# Patient Record
Sex: MALE | Race: BLACK OR AFRICAN AMERICAN | HISPANIC OR LATINO | Employment: OTHER | URBAN - METROPOLITAN AREA
[De-identification: names, ages, dates, MRNs, and addresses within clinical notes are randomized per-mention and may not be internally consistent; named-entity substitution may affect disease eponyms.]

---

## 2022-11-01 ENCOUNTER — OFFICE VISIT (OUTPATIENT)
Dept: UROLOGY | Facility: CLINIC | Age: 72
End: 2022-11-01
Payer: MEDICARE

## 2022-11-01 VITALS — HEART RATE: 84 BPM | DIASTOLIC BLOOD PRESSURE: 74 MMHG | TEMPERATURE: 99 F | SYSTOLIC BLOOD PRESSURE: 139 MMHG

## 2022-11-01 DIAGNOSIS — R97.20 ELEVATED PSA: Primary | ICD-10-CM

## 2022-11-01 LAB — PSA, DIAGNOSTIC: 6.38 NG/ML (ref 0–4)

## 2022-11-01 PROCEDURE — 99204 OFFICE O/P NEW MOD 45 MIN: CPT | Mod: S$GLB,,, | Performed by: UROLOGY

## 2022-11-01 PROCEDURE — 99204 PR OFFICE/OUTPT VISIT, NEW, LEVL IV, 45-59 MIN: ICD-10-PCS | Mod: S$GLB,,, | Performed by: UROLOGY

## 2022-11-01 RX ORDER — ATORVASTATIN CALCIUM 20 MG/1
20 TABLET, FILM COATED ORAL DAILY
COMMUNITY
Start: 2022-10-21

## 2022-11-01 RX ORDER — AMLODIPINE BESYLATE 10 MG/1
10 TABLET ORAL DAILY
COMMUNITY
Start: 2022-09-03

## 2022-11-01 RX ORDER — METFORMIN HYDROCHLORIDE 1000 MG/1
1000 TABLET ORAL 2 TIMES DAILY
COMMUNITY
Start: 2022-09-27

## 2022-11-01 RX ORDER — METOPROLOL SUCCINATE 200 MG/1
TABLET, EXTENDED RELEASE ORAL
COMMUNITY
Start: 2022-10-31

## 2022-11-01 RX ORDER — INSULIN GLARGINE 100 [IU]/ML
INJECTION, SOLUTION SUBCUTANEOUS
COMMUNITY
Start: 2022-05-17

## 2022-11-01 RX ORDER — ALLOPURINOL 300 MG/1
300 TABLET ORAL DAILY
COMMUNITY
Start: 2022-10-09

## 2022-11-01 RX ORDER — DULAGLUTIDE 1.5 MG/.5ML
INJECTION, SOLUTION SUBCUTANEOUS
COMMUNITY
Start: 2022-10-19 | End: 2023-11-30

## 2022-11-01 NOTE — PROGRESS NOTES
Subjective:       Patient ID: Dewey Wade is a 72 y.o. male.    Chief Complaint: Establish Care      HPI:  72-year-old male with a history of kidney stones and elevated PSA former 1E patient has not been seen since mid 2019, last beyond last PSA on record was around 5.34.9 area patient has not had any real issues does have a history kidney stones in the past    Past Medical History:   Past Medical History:   Diagnosis Date    Diabetes mellitus     Hypertension     Kidney stone        Past Surgical Historical:   Past Surgical History:   Procedure Laterality Date    HERNIA REPAIR          Medications:   Medication List with Changes/Refills   Current Medications    ALLOPURINOL (ZYLOPRIM) 300 MG TABLET    Take 300 mg by mouth once daily.    AMLODIPINE (NORVASC) 10 MG TABLET    Take 10 mg by mouth once daily.    ATORVASTATIN (LIPITOR) 20 MG TABLET    Take 20 mg by mouth once daily.    BASAGLAR KWIKPEN U-100 INSULIN GLARGINE 100 UNITS/ML SUBQ PEN    INJECT 40 UNITS BY SUBCUTANEOUS ROUTE AT BEDTIME    METFORMIN (GLUCOPHAGE) 1000 MG TABLET    Take 1,000 mg by mouth 2 (two) times daily.    METOPROLOL SUCCINATE (TOPROL-XL) 200 MG 24 HR TABLET        TRULICITY 1.5 MG/0.5 ML PEN INJECTOR    INJECT 0.5 MILLILITER (1.5 MG) BY SUBCUTANEOUS ROUTE EVERY 7 DAYS        Past Social History:   Social History     Socioeconomic History    Marital status: Unknown   Tobacco Use    Smoking status: Never    Smokeless tobacco: Never   Substance and Sexual Activity    Alcohol use: Never    Drug use: Never       Allergies: Review of patient's allergies indicates:  No Known Allergies     Family History: History reviewed. No pertinent family history.     Review of Systems:  Review of Systems   Constitutional:  Negative for activity change and appetite change.   HENT:  Negative for congestion and dental problem.    Eyes:  Negative for visual disturbance.   Respiratory:  Negative for chest tightness and shortness of breath.    Cardiovascular:   Negative for chest pain.   Gastrointestinal:  Negative for abdominal distention and abdominal pain.   Endocrine: Negative for polyuria.   Genitourinary:  Negative for difficulty urinating, dysuria, flank pain, frequency, hematuria, penile discharge, penile pain, penile swelling, scrotal swelling, testicular pain and urgency.   Musculoskeletal:  Negative for back pain and neck pain.   Skin:  Negative for color change.   Allergic/Immunologic: Positive for immunocompromised state.   Neurological:  Negative for dizziness.   Hematological:  Negative for adenopathy.   Psychiatric/Behavioral:  Negative for agitation, behavioral problems and confusion.      Physical Exam:  Physical Exam  Constitutional:       General: He is not in acute distress.     Appearance: He is well-developed.   HENT:      Head: Normocephalic and atraumatic.      Nose: Nose normal.   Eyes:      General: No scleral icterus.     Conjunctiva/sclera: Conjunctivae normal.      Pupils: Pupils are equal, round, and reactive to light.   Neck:      Thyroid: No thyromegaly.      Trachea: No tracheal deviation.   Cardiovascular:      Rate and Rhythm: Normal rate and regular rhythm.      Heart sounds: Normal heart sounds.   Pulmonary:      Effort: Pulmonary effort is normal. No respiratory distress.      Breath sounds: Normal breath sounds. No wheezing or rales.   Abdominal:      General: Bowel sounds are normal. There is no distension.      Palpations: Abdomen is soft.      Tenderness: There is no abdominal tenderness. There is no guarding or rebound.   Genitourinary:     Penis: Normal. No tenderness.       Prostate: Normal.   Musculoskeletal:         General: No deformity. Normal range of motion.      Cervical back: Neck supple.   Lymphadenopathy:      Cervical: No cervical adenopathy.   Skin:     General: Skin is warm and dry.      Findings: No erythema or rash.   Neurological:      Mental Status: He is alert and oriented to person, place, and time.       Cranial Nerves: No cranial nerve deficit.   Psychiatric:         Behavior: Behavior normal.       Assessment/Plan:       Problem List Items Addressed This Visit    None  Visit Diagnoses       Elevated PSA    -  Primary    Relevant Orders    Prostate Specific Antigen, Diagnostic               Nephrolithiasis:  Currently asymptomatic     Elevated PSA we will draw the patient's PSA determine need for biopsy at that time.

## 2022-11-02 ENCOUNTER — TELEPHONE (OUTPATIENT)
Dept: UROLOGY | Facility: CLINIC | Age: 72
End: 2022-11-02
Payer: MEDICARE

## 2022-11-02 DIAGNOSIS — R97.20 ELEVATED PSA: Primary | ICD-10-CM

## 2022-11-02 NOTE — TELEPHONE ENCOUNTER
Patient notified of PSA results. The office will be contacting him to schedule a biopsy. Patient verbalized understanding and stated that he did not have any questions at this time.

## 2022-11-02 NOTE — TELEPHONE ENCOUNTER
----- Message from Sol Knowles sent at 11/2/2022  2:20 PM CDT -----  Contact: self  Pt returned office call. Pt can be reached at [phone]

## 2022-12-16 DIAGNOSIS — R97.20 ELEVATED PSA: Primary | ICD-10-CM

## 2022-12-19 ENCOUNTER — TELEPHONE (OUTPATIENT)
Dept: UROLOGY | Facility: CLINIC | Age: 72
End: 2022-12-19

## 2022-12-19 RX ORDER — CIPROFLOXACIN 500 MG/1
500 TABLET ORAL 2 TIMES DAILY
Qty: 8 TABLET | Refills: 0 | Status: SHIPPED | OUTPATIENT
Start: 2023-01-02 | End: 2022-12-27

## 2022-12-19 RX ORDER — DIAZEPAM 10 MG/1
10 TABLET ORAL ONCE
Qty: 1 TABLET | Refills: 0 | Status: SHIPPED | OUTPATIENT
Start: 2022-12-19 | End: 2022-12-27

## 2022-12-19 NOTE — TELEPHONE ENCOUNTER
Pt rescheduled nurse visit due to covid.     ----- Message from Lashell Waldron sent at 12/19/2022 10:04 AM CST -----  Contact: self  Patient called to reschedule his nurse visit for today at 3 pm due to having Covid please call at [phone] ([facility])/     [phone]

## 2022-12-27 DIAGNOSIS — R97.20 ELEVATED PSA: Primary | ICD-10-CM

## 2022-12-27 RX ORDER — DIAZEPAM 10 MG/1
10 TABLET ORAL ONCE
Qty: 1 TABLET | Refills: 0 | Status: SHIPPED | OUTPATIENT
Start: 2022-12-27 | End: 2022-12-27

## 2022-12-27 RX ORDER — CIPROFLOXACIN 500 MG/1
500 TABLET ORAL 2 TIMES DAILY
Qty: 8 TABLET | Refills: 0 | Status: SHIPPED | OUTPATIENT
Start: 2022-12-27

## 2023-01-03 ENCOUNTER — PROCEDURE VISIT (OUTPATIENT)
Dept: UROLOGY | Facility: CLINIC | Age: 73
End: 2023-01-03
Payer: MEDICARE

## 2023-01-03 VITALS
BODY MASS INDEX: 32.99 KG/M2 | WEIGHT: 270.88 LBS | HEIGHT: 76 IN | HEART RATE: 75 BPM | SYSTOLIC BLOOD PRESSURE: 93 MMHG | RESPIRATION RATE: 16 BRPM | DIASTOLIC BLOOD PRESSURE: 52 MMHG | OXYGEN SATURATION: 96 %

## 2023-01-03 DIAGNOSIS — R97.20 ELEVATED PSA: Primary | ICD-10-CM

## 2023-01-03 PROCEDURE — 76872 US TRANSRECTAL: CPT | Mod: 26,S$GLB,, | Performed by: UROLOGY

## 2023-01-03 PROCEDURE — 55700: CPT | Mod: S$GLB,,, | Performed by: UROLOGY

## 2023-01-03 PROCEDURE — 76872: ICD-10-PCS | Mod: 26,S$GLB,, | Performed by: UROLOGY

## 2023-01-03 PROCEDURE — 55700: ICD-10-PCS | Mod: S$GLB,,, | Performed by: UROLOGY

## 2023-01-03 NOTE — PROCEDURES
"Transrectal ultrasound-guided prostate biopsy    Date/Time: 1/3/2023 10:00 AM  Performed by: Sandro Vasquez MD  Authorized by: Yesenia Temple NP     Consent Done?:  Yes (Written)  Time out: Immediately prior to procedure a "time out" was called to verify the correct patient, procedure, equipment, support staff and site/side marked as required.    Indications: Elevated PSA    Position:  Left lateral  Anesthesia:  Pudendal nerve block  Patient sedated: No    Prostate Size:  122g  Left Base Biopsies: 2  Left Mid Biopsies: 2  Left [place] Biopsies: 2  Right Base Biopsies: 2  Right Mid Biopsies: 2  Right [place] Biopsies: 2  Total Biopsies:  12    Patient tolerance:  Patient tolerated the procedure well with no immediate complications     Patient was brought to the procedure room placed on the table in left lateral decubitus position lidocaine jelly was instilled into the rectum the ultrasound probe was advanced to level of prostate and a total of 10 cc of lidocaine was injected into the bilateral ana prostatic fossa.  A standard 12 core prostate biopsy was obtained patient tolerated the procedure well there were no complications  "

## 2023-01-03 NOTE — PATIENT INSTRUCTIONS
Patient Education       Prostate Biopsy Discharge Instructions   About this topic   The prostate is a part of your body that helps make semen. The prostate is located at the base of the penis and in front of the rectum.  Prostate biopsy is done:  To help your doctor know if the lump or tumor in your prostate is cancer or not.  If your blood test, called PSA or prostate specific antigen, is high. High PSA in the blood means disease in the prostate.  During a prostate biopsy, the doctor uses a needle to collect pieces of tissue from the prostate. The doctor sends the tissue to the lab. The lab then checks the tissue for infection or cancer.     What care is needed at home?   Ask your doctor what you need to do when you go home. Make sure you ask questions if you do not understand what the doctor says. This way you will know what you need to do.  Rest after the procedure to prevent bleeding from the biopsy site. Avoid activities like heavy lifting and hard exercise.  You may see some blood in your urine or stools for the next few days. You may also have blood in your semen for a few weeks.  Drink up to 8 glasses of water a day to help flush out blood.  Use an ice pack to help with the pain and to help stop the bleeding for the first 2 days after the procedure. Place an ice pack or a bag of frozen peas wrapped in a towel over the painful part. Never put ice right on the skin. Do not leave the ice on more than 10 to 15 minutes at a time. Use ice each hour as needed.  Keep your rectal opening and penis clean to prevent infection.  Keep your wound dry for the next 24 hours. Ask your doctor about when you may take a bath or shower.  Change the dressing if it gets soaked.  What follow-up care is needed?   Your doctor may ask you to make visits to the office to check on your progress. Be sure to keep your visits.  It may take up to 2 weeks for your doctor to get the results. You may be asked to return to the doctor's office  for the result of the biopsy in 2 to 3 weeks. The results will help your doctor understand what kind of problem you have with your prostate. Together you can make a plan for more care.  Your doctor will talk with you if any other treatment is needed.  What drugs may be needed?   The doctor may order drugs to:  Help with pain  Prevent infection  Will physical activity be limited?   Avoid doing activities that may put pressure on your rectal area for the next 7 days. You may be more comfortable if you do not ride a bike, horse, or motorcycle.  Limit your sexual activity for a few days after the procedure. Ask your doctor when you can have sex.  Do not strain when going to the bathroom. Don't hold your urine. Holding back from urinating can irritate your bladder and lead to a urinary tract infection.  Avoid constipation by eating foods high in fiber and staying hydrated. Straining to pass stool can worsen your symptoms as you heal.  What problems could happen?   Infection at the biopsy site  Infection elsewhere in your body  Bleeding from your rectum, or blood in your urine or semen  Tumor spread  Very bad pain  Bladder or rectum perforation  Urinary tract infection  Trouble passing urine  Erectile dysfunction  Reduced sexual activity  When do I need to call the doctor?   Signs of infection like fever of 100.4°F (38°C) or higher, chills, burning or pain when you pass urine.  No urine or more problems passing urine  Dizziness, confusion, or weakness  Yellowish, greenish, or bloody discharge from the penis  Lots of rectal bleeding or large amounts of blood in the urine  Pain does not go away even after taking your drugs  Teach Back: Helping You Understand   The Teach Back Method helps you understand the information we are giving you. After you talk with the staff, tell them in your own words what you learned. This helps to make sure the staff has described each thing clearly. It also helps to explain things that may have  been confusing. Before going home, make sure you can do these:  I can tell you about my procedure.  I can tell you what may help ease my pain.  I can tell you what I will do if I have a fever, chills, problems passing urine, or drainage from my penis.  NO LIFTING GREATER THAN 10 LBS  NO STRENUOUS ACTIVITY FOR 3 DAYS  NO SEXUAL ACTIVITY FOR 7 DAYS    Where can I learn more?   American Cancer Society  https://www.cancer.org/cancer/prostate-cancer/evchbfxcs-mtrbvuhls-ikojkkq/how-diagnosed.html   [employer] of [place]  https://www.radiologyinfo.org/en/info.cfm?pg=prostate-biopsy   Last Reviewed Date   2021-05-18  Consumer Information Use and Disclaimer   This information is not specific medical advice and does not replace information you receive from your health care provider. This is only a brief summary of general information. It does NOT include all information about conditions, illnesses, injuries, tests, procedures, treatments, therapies, discharge instructions or life-style choices that may apply to you. You must talk with your health care provider for complete information about your health and treatment options. This information should not be used to decide whether or not to accept your health care providers advice, instructions or recommendations. Only your health care provider has the knowledge and training to provide advice that is right for you.  Copyright   Copyright © 2021 [employer] and its affiliates and/or licensors. All rights reserved.

## 2023-01-06 ENCOUNTER — TELEPHONE (OUTPATIENT)
Dept: UROLOGY | Facility: CLINIC | Age: 73
End: 2023-01-06
Payer: MEDICARE

## 2023-01-11 ENCOUNTER — TELEPHONE (OUTPATIENT)
Dept: UROLOGY | Facility: CLINIC | Age: 73
End: 2023-01-11
Payer: MEDICARE

## 2023-01-11 NOTE — TELEPHONE ENCOUNTER
----- Message from Edna Cantu sent at 1/11/2023  1:41 PM CST -----  Contact: pt  Pt calling wanting to know if he can donate blood and he can be reached at [phone].    Thanks,

## 2023-01-11 NOTE — TELEPHONE ENCOUNTER
Patient notified there are no urologic restrictions in regards to blood donation, advised to check with facility he is interested in donating to see if he meets their criteria. Verbalized understanding.

## 2023-05-01 ENCOUNTER — CLINICAL SUPPORT (OUTPATIENT)
Dept: UROLOGY | Facility: CLINIC | Age: 73
End: 2023-05-01
Payer: MEDICARE

## 2023-05-01 DIAGNOSIS — R97.20 ELEVATED PSA: Primary | ICD-10-CM

## 2023-05-01 LAB — PSA, DIAGNOSTIC: 8.38 NG/ML (ref 0.1–4)

## 2023-05-01 NOTE — PROGRESS NOTES
Here for PSA draw.  Right A/C x straight stick x 1.  Gauze and coban applied.  Pt tolerated well.  Aware of next appointment.

## 2023-05-09 ENCOUNTER — OFFICE VISIT (OUTPATIENT)
Dept: UROLOGY | Facility: CLINIC | Age: 73
End: 2023-05-09
Payer: MEDICARE

## 2023-05-09 DIAGNOSIS — R97.20 ELEVATED PSA: Primary | ICD-10-CM

## 2023-05-09 PROCEDURE — 99214 OFFICE O/P EST MOD 30 MIN: CPT | Mod: S$GLB,,, | Performed by: UROLOGY

## 2023-05-09 PROCEDURE — 99214 PR OFFICE/OUTPT VISIT, EST, LEVL IV, 30-39 MIN: ICD-10-PCS | Mod: S$GLB,,, | Performed by: UROLOGY

## 2023-05-09 NOTE — PROGRESS NOTES
Subjective:       Patient ID: Dewey Wade is a 72 y.o. male.    Chief Complaint: 6 month psa f/u      HPI:  9-year-old male history of elevated PSA underwent prostate biopsy in January of 2023 he had 1 core of ASAP PSA prior to that was 6.38 in November now it is 8.38.    Past Medical History:   Past Medical History:   Diagnosis Date    Diabetes mellitus     Hypertension     Kidney stone        Past Surgical Historical:   Past Surgical History:   Procedure Laterality Date    HERNIA REPAIR          Medications:   Medication List with Changes/Refills   Current Medications    ALLOPURINOL (ZYLOPRIM) 300 MG TABLET    Take 300 mg by mouth once daily.    AMLODIPINE (NORVASC) 10 MG TABLET    Take 10 mg by mouth once daily.    ATORVASTATIN (LIPITOR) 20 MG TABLET    Take 20 mg by mouth once daily.    BASAGLAR KWIKPEN U-100 INSULIN GLARGINE 100 UNITS/ML SUBQ PEN    INJECT 40 UNITS BY SUBCUTANEOUS ROUTE AT BEDTIME    CIPROFLOXACIN HCL (CIPRO) 500 MG TABLET    Take 1 tablet (500 mg total) by mouth 2 (two) times daily.    DIAZEPAM (VALIUM) 10 MG TAB    Take 1 tablet (10 mg total) by mouth once. for 1 dose    METFORMIN (GLUCOPHAGE) 1000 MG TABLET    Take 1,000 mg by mouth 2 (two) times daily.    METOPROLOL SUCCINATE (TOPROL-XL) 200 MG 24 HR TABLET        TRULICITY 1.5 MG/0.5 ML PEN INJECTOR    INJECT 0.5 MILLILITER (1.5 MG) BY SUBCUTANEOUS ROUTE EVERY 7 DAYS        Past Social History:   Social History     Socioeconomic History    Marital status: Unknown   Tobacco Use    Smoking status: Never    Smokeless tobacco: Never   Substance and Sexual Activity    Alcohol use: Never    Drug use: Never       Allergies: Review of patient's allergies indicates:  No Known Allergies     Family History: History reviewed. No pertinent family history.     Review of Systems:  Review of Systems   Constitutional:  Negative for activity change and appetite change.   HENT:  Negative for congestion and dental problem.    Eyes:  Negative for visual  disturbance.   Respiratory:  Negative for chest tightness and shortness of breath.    Cardiovascular:  Negative for chest pain.   Gastrointestinal:  Negative for abdominal distention and abdominal pain.   Endocrine: Negative for polyuria.   Genitourinary:  Negative for difficulty urinating, dysuria, flank pain, frequency, hematuria, penile discharge, penile pain, penile swelling, scrotal swelling, testicular pain and urgency.   Musculoskeletal:  Negative for back pain and neck pain.   Skin:  Negative for color change.   Allergic/Immunologic: Positive for immunocompromised state.   Neurological:  Negative for dizziness.   Hematological:  Negative for adenopathy.   Psychiatric/Behavioral:  Negative for agitation, behavioral problems and confusion.      Physical Exam:  Physical Exam  Constitutional:       General: He is not in acute distress.     Appearance: He is well-developed.   HENT:      Head: Normocephalic and atraumatic.      Nose: Nose normal.   Eyes:      General: No scleral icterus.     Conjunctiva/sclera: Conjunctivae normal.      Pupils: Pupils are equal, round, and reactive to light.   Neck:      Thyroid: No thyromegaly.      Trachea: No tracheal deviation.   Cardiovascular:      Rate and Rhythm: Normal rate and regular rhythm.      Heart sounds: Normal heart sounds.   Pulmonary:      Effort: Pulmonary effort is normal. No respiratory distress.      Breath sounds: Normal breath sounds. No wheezing or rales.   Abdominal:      General: Bowel sounds are normal. There is no distension.      Palpations: Abdomen is soft.      Tenderness: There is no abdominal tenderness. There is no guarding or rebound.   Genitourinary:     Penis: Normal. No tenderness.       Prostate: Normal.   Musculoskeletal:         General: No deformity. Normal range of motion.      Cervical back: Neck supple.   Lymphadenopathy:      Cervical: No cervical adenopathy.   Skin:     General: Skin is warm and dry.      Findings: No erythema or  rash.   Neurological:      Mental Status: He is alert and oriented to person, place, and time.      Cranial Nerves: No cranial nerve deficit.   Psychiatric:         Behavior: Behavior normal.       Assessment/Plan:       Problem List Items Addressed This Visit    None  Visit Diagnoses       Elevated PSA    -  Primary    Relevant Orders    MRI Prostate W W/O Contrast               Actively rising PSA despite negative biopsy 4 months ago.  We discussed MRI of the prostate we will order this return to clinic after MRI is complete

## 2023-05-17 ENCOUNTER — TELEPHONE (OUTPATIENT)
Dept: UROLOGY | Facility: CLINIC | Age: 73
End: 2023-05-17
Payer: MEDICARE

## 2023-05-17 NOTE — TELEPHONE ENCOUNTER
Spoke with pt and informed him he should call [facility] to see if he should be NPO. Pt verbalized understanding.     ----- Message from Christi Leo sent at 5/17/2023  1:13 PM CDT -----  Regarding: Xray's  Contact: patient  Per phone call with  patient,he stated that he has an xray MRI prostate with or without contrast to do on tomorrow and he wanted to know if he should eat anything or not.  Please return call at [phone] (home).    Thanks,  SJ

## 2023-05-24 ENCOUNTER — TELEPHONE (OUTPATIENT)
Dept: UROLOGY | Facility: CLINIC | Age: 73
End: 2023-05-24
Payer: MEDICARE

## 2023-05-24 NOTE — TELEPHONE ENCOUNTER
----- Message from Yesenia Temple NP sent at 5/23/2023  2:09 PM CDT -----  Please notify patient to follow up in 6 months for psa draw/follow up visit. Pi rads 2 noted on MRI which is not a concerning finding. Result reviewed with Dr. Vasquez

## 2023-05-25 ENCOUNTER — TELEPHONE (OUTPATIENT)
Dept: UROLOGY | Facility: CLINIC | Age: 73
End: 2023-05-25
Payer: MEDICARE

## 2023-05-25 NOTE — TELEPHONE ENCOUNTER
Contacted pt, advised of results, 6 month psa appts scheduled. Pt verbalized understanding. BJP  ----- Message from Carmita Perez sent at 5/25/2023  8:42 AM CDT -----  Contact: self  Pt is calling in regards to MRI results. Please call pt at [phone] or [phone]

## 2023-11-27 ENCOUNTER — CLINICAL SUPPORT (OUTPATIENT)
Dept: UROLOGY | Facility: CLINIC | Age: 73
End: 2023-11-27
Payer: MEDICARE

## 2023-11-27 DIAGNOSIS — R97.20 ELEVATED PSA: Primary | ICD-10-CM

## 2023-11-27 LAB — PSA, DIAGNOSTIC: 8.26 NG/ML (ref 0.1–4)

## 2023-11-27 NOTE — PROGRESS NOTES
Here for PSA draw via right A/C with straight stick x 1.  Gauze and coban applied.  Tolerated  well.

## 2023-11-30 ENCOUNTER — OFFICE VISIT (OUTPATIENT)
Dept: UROLOGY | Facility: CLINIC | Age: 73
End: 2023-11-30
Payer: MEDICARE

## 2023-11-30 VITALS — BODY MASS INDEX: 34.95 KG/M2 | HEIGHT: 76 IN | WEIGHT: 287 LBS

## 2023-11-30 DIAGNOSIS — R97.20 ELEVATED PSA: Primary | ICD-10-CM

## 2023-11-30 PROCEDURE — 99214 PR OFFICE/OUTPT VISIT, EST, LEVL IV, 30-39 MIN: ICD-10-PCS | Mod: S$GLB,,, | Performed by: UROLOGY

## 2023-11-30 PROCEDURE — 99214 OFFICE O/P EST MOD 30 MIN: CPT | Mod: S$GLB,,, | Performed by: UROLOGY

## 2023-11-30 RX ORDER — HYDRALAZINE HYDROCHLORIDE 25 MG/1
25 TABLET, FILM COATED ORAL 3 TIMES DAILY
COMMUNITY
Start: 2023-09-25

## 2023-11-30 NOTE — PROGRESS NOTES
Subjective:       Patient ID: Dewey Wade is a 73 y.o. male.    Chief Complaint: No chief complaint on file.      HPI: 73-year-old male patient presenting to the clinic to follow up for elevated PSA.  Patient's most recent PSA on 11/27/2023 was 8.26.  He had a benign biopsy with 1 core ASAP on 01/03/2023.  He also had a prostate MRI on 05/19/2023 PI-RADS 2..  He denies any family history of prostate cancer.    11/27/2023   8.26  5/19/2023    Prostate MRI - PI-RADS 2  05/01/2023  8.38  01/03/2023  Benign Prostate biopsy/ 1 core ASAP  11/01/2022  6.380         Past Medical History:   Past Medical History:   Diagnosis Date    Diabetes mellitus     Hypertension     Kidney stone        Past Surgical Historical:   Past Surgical History:   Procedure Laterality Date    COLONOSCOPY      HERNIA REPAIR          Medications:   Medication List with Changes/Refills   Current Medications    ALLOPURINOL (ZYLOPRIM) 300 MG TABLET    Take 300 mg by mouth once daily.    AMLODIPINE (NORVASC) 10 MG TABLET    Take 10 mg by mouth once daily.    ATORVASTATIN (LIPITOR) 20 MG TABLET    Take 20 mg by mouth once daily.    BASAGLAR KWIKPEN U-100 INSULIN GLARGINE 100 UNITS/ML SUBQ PEN    INJECT 40 UNITS BY SUBCUTANEOUS ROUTE AT BEDTIME    CIPROFLOXACIN HCL (CIPRO) 500 MG TABLET    Take 1 tablet (500 mg total) by mouth 2 (two) times daily.    DIAZEPAM (VALIUM) 10 MG TAB    Take 1 tablet (10 mg total) by mouth once. for 1 dose    HYDRALAZINE (APRESOLINE) 25 MG TABLET    Take 25 mg by mouth 3 (three) times daily.    METFORMIN (GLUCOPHAGE) 1000 MG TABLET    Take 1,000 mg by mouth 2 (two) times daily.    METOPROLOL SUCCINATE (TOPROL-XL) 200 MG 24 HR TABLET       Discontinued Medications    TRULICITY 1.5 MG/0.5 ML PEN INJECTOR    INJECT 0.5 MILLILITER (1.5 MG) BY SUBCUTANEOUS ROUTE EVERY 7 DAYS        Past Social History:   Social History     Socioeconomic History    Marital status: Unknown   Tobacco Use    Smoking status: Never    Smokeless tobacco:  Never   Substance and Sexual Activity    Alcohol use: Never    Drug use: Never       Allergies: Review of patient's allergies indicates:  No Known Allergies     Family History: No family history on file.     Review of Systems:  Review of Systems   Constitutional: Negative.    HENT: Negative.     Eyes: Negative.    Respiratory: Negative.     Cardiovascular: Negative.    Gastrointestinal: Negative.    Endocrine: Negative.    Genitourinary: Negative.    Musculoskeletal: Negative.    Skin: Negative.    Allergic/Immunologic: Negative.    Neurological: Negative.    Hematological: Negative.    Psychiatric/Behavioral: Negative.         Physical Exam:  Physical Exam  Constitutional:       Appearance: He is normal weight.   HENT:      Head: Normocephalic.      Nose: Nose normal.      Mouth/Throat:      Mouth: Mucous membranes are moist.      Pharynx: Oropharynx is clear.   Eyes:      Extraocular Movements: Extraocular movements intact.      Conjunctiva/sclera: Conjunctivae normal.      Pupils: Pupils are equal, round, and reactive to light.   Cardiovascular:      Rate and Rhythm: Normal rate and regular rhythm.      Pulses: Normal pulses.      Heart sounds: Normal heart sounds.   Pulmonary:      Effort: Pulmonary effort is normal.      Breath sounds: Normal breath sounds.   Abdominal:      General: Abdomen is flat. Bowel sounds are normal.      Palpations: Abdomen is soft.      Hernia: There is no hernia in the right inguinal area or left inguinal area.   Genitourinary:     Penis: Normal. No phimosis, paraphimosis, hypospadias, erythema, tenderness or discharge.       Testes: Normal.         Right: Mass, tenderness or swelling not present. Right testis is descended. Cremasteric reflex is present.          Left: Mass, tenderness or swelling not present. Left testis is descended. Cremasteric reflex is present.       Prostate: Normal.      Rectum: Normal.   Musculoskeletal:         General: Normal range of motion.      Cervical  back: Normal range of motion and neck supple.   Lymphadenopathy:      Lower Body: No right inguinal adenopathy. No left inguinal adenopathy.   Skin:     General: Skin is warm and dry.      Capillary Refill: Capillary refill takes less than 2 seconds.   Neurological:      General: No focal deficit present.      Mental Status: He is alert and oriented to person, place, and time. Mental status is at baseline.   Psychiatric:         Mood and Affect: Mood normal.         Behavior: Behavior normal.         Thought Content: Thought content normal.         Judgment: Judgment normal.         Assessment/Plan:     Elevated PSA:  Patient has had a negative prostate biopsy as well as negative prostate MRI.  He denies any family history of prostate cancer.  His PSA has decreased slightly in comparison to his last PSA in May.  He will return to clinic in 6 months with a PSA    I, Dr. Sandro Vasquez have seen and personally evaluated the patient. I have formulated the plan reviewed all pertinent imaging and clinical data.  I agree with the nurse practitioner's assessment, and I have personally formulated the plan for this patient's care as described by the midlevel.  Problem List Items Addressed This Visit    None

## 2023-12-01 ENCOUNTER — TELEPHONE (OUTPATIENT)
Dept: UROLOGY | Facility: CLINIC | Age: 73
End: 2023-12-01
Payer: MEDICARE

## 2023-12-01 NOTE — TELEPHONE ENCOUNTER
Contacted pt, he wants to know if he can still donate blood. Advised that he can do so. Pt also would like a copy of results, advised that it will be at . [facility]    ----- Message from Hillary Muñiz sent at 12/1/2023 10:45 AM CST -----  Patient is requesting a call back.  Please send call him back at [phone].            Thanks  [email or web address]

## 2024-05-30 ENCOUNTER — CLINICAL SUPPORT (OUTPATIENT)
Dept: UROLOGY | Facility: CLINIC | Age: 74
End: 2024-05-30
Payer: MEDICARE

## 2024-05-30 DIAGNOSIS — R97.20 ELEVATED PSA: Primary | ICD-10-CM

## 2024-05-30 LAB — PSA, DIAGNOSTIC: 8.71 NG/ML (ref 0.1–4)

## 2024-05-30 PROCEDURE — 36415 COLL VENOUS BLD VENIPUNCTURE: CPT | Mod: S$GLB,,, | Performed by: UROLOGY

## 2024-05-30 NOTE — PROGRESS NOTES
Verified name and . Using aseptic technique, straight stick 22g x1 with success. 1x1 gauze and bandage applied to puncture site. Confirmed scheduled follow up appt. Tolerated well.

## 2024-06-04 ENCOUNTER — OFFICE VISIT (OUTPATIENT)
Dept: UROLOGY | Facility: CLINIC | Age: 74
End: 2024-06-04
Payer: MEDICARE

## 2024-06-04 VITALS
HEART RATE: 70 BPM | SYSTOLIC BLOOD PRESSURE: 178 MMHG | RESPIRATION RATE: 20 BRPM | DIASTOLIC BLOOD PRESSURE: 89 MMHG | OXYGEN SATURATION: 98 %

## 2024-06-04 DIAGNOSIS — R97.20 ELEVATED PSA: Primary | ICD-10-CM

## 2024-06-04 PROCEDURE — 99214 OFFICE O/P EST MOD 30 MIN: CPT | Mod: S$GLB,,, | Performed by: UROLOGY

## 2024-06-04 RX ORDER — HYDROCHLOROTHIAZIDE 25 MG/1
25 TABLET ORAL
COMMUNITY
Start: 2024-03-13

## 2024-06-04 NOTE — PROGRESS NOTES
Subjective:       Patient ID: Dewey Wade is a 73 y.o. male.    Chief Complaint: 6 mth f/u, H/O elevated PSA, and scrotal cyst/bump      HPI: 73-year-old male patient presenting to the clinic to follow up for elevated PSA.  Patient's most recent PSA on 05/30/2024 was 8.71.  He had a benign biopsy with 1 core ASAP on 01/03/2023.  He also had a prostate MRI on 05/19/2023 PI-RADS 2..  He denies any family history of prostate cancer.    05/30/2024   8.71  11/27/2023   8.26  5/19/2023    Prostate MRI - PI-RADS 2  05/01/2023  8.38  01/03/2023  Benign Prostate biopsy/ 1 core ASAP  11/01/2022  6.380         Past Medical History:   Past Medical History:   Diagnosis Date    Diabetes mellitus     Elevated PSA     Hypertension     Kidney stone        Past Surgical Historical:   Past Surgical History:   Procedure Laterality Date    COLONOSCOPY      HERNIA REPAIR          Medications:   Medication List with Changes/Refills   Current Medications    ALLOPURINOL (ZYLOPRIM) 300 MG TABLET    Take 300 mg by mouth once daily.    AMLODIPINE (NORVASC) 10 MG TABLET    Take 10 mg by mouth once daily.    ATORVASTATIN (LIPITOR) 20 MG TABLET    Take 20 mg by mouth once daily.    BASAGLAR KWIKPEN U-100 INSULIN GLARGINE 100 UNITS/ML SUBQ PEN    INJECT 40 UNITS BY SUBCUTANEOUS ROUTE AT BEDTIME    CIPROFLOXACIN HCL (CIPRO) 500 MG TABLET    Take 1 tablet (500 mg total) by mouth 2 (two) times daily.    DIAZEPAM (VALIUM) 10 MG TAB    Take 1 tablet (10 mg total) by mouth once. for 1 dose    HYDRALAZINE (APRESOLINE) 25 MG TABLET    Take 25 mg by mouth 3 (three) times daily.    HYDROCHLOROTHIAZIDE (HYDRODIURIL) 25 MG TABLET    Take 25 mg by mouth.    METFORMIN (GLUCOPHAGE) 1000 MG TABLET    Take 1,000 mg by mouth 2 (two) times daily.    METOPROLOL SUCCINATE (TOPROL-XL) 200 MG 24 HR TABLET            Past Social History:   Social History     Socioeconomic History    Marital status: Unknown   Tobacco Use    Smoking status: Never    Smokeless tobacco:  Never   Substance and Sexual Activity    Alcohol use: Never    Drug use: Never       Allergies: Review of patient's allergies indicates:  No Known Allergies     Family History: No family history on file.     Review of Systems:  Review of Systems   Constitutional: Negative.    HENT: Negative.     Eyes: Negative.    Respiratory: Negative.     Cardiovascular: Negative.    Gastrointestinal: Negative.    Endocrine: Negative.    Genitourinary: Negative.    Musculoskeletal: Negative.    Skin: Negative.    Allergic/Immunologic: Negative.    Neurological: Negative.    Hematological: Negative.    Psychiatric/Behavioral: Negative.         Physical Exam:  Physical Exam  Constitutional:       Appearance: He is normal weight.   HENT:      Head: Normocephalic.      Nose: Nose normal.      Mouth/Throat:      Mouth: Mucous membranes are moist.      Pharynx: Oropharynx is clear.   Eyes:      Extraocular Movements: Extraocular movements intact.      Conjunctiva/sclera: Conjunctivae normal.      Pupils: Pupils are equal, round, and reactive to light.   Cardiovascular:      Rate and Rhythm: Normal rate and regular rhythm.      Pulses: Normal pulses.      Heart sounds: Normal heart sounds.   Pulmonary:      Effort: Pulmonary effort is normal.      Breath sounds: Normal breath sounds.   Abdominal:      General: Abdomen is flat. Bowel sounds are normal.      Palpations: Abdomen is soft.      Hernia: There is no hernia in the right inguinal area or left inguinal area.   Genitourinary:     Penis: Normal. No phimosis, paraphimosis, hypospadias, erythema, tenderness or discharge.       Testes: Normal.         Right: Mass, tenderness or swelling not present. Right testis is descended. Cremasteric reflex is present.          Left: Mass, tenderness or swelling not present. Left testis is descended. Cremasteric reflex is present.       Prostate: Normal.      Rectum: Normal.   Musculoskeletal:         General: Normal range of motion.      Cervical  back: Normal range of motion and neck supple.   Lymphadenopathy:      Lower Body: No right inguinal adenopathy. No left inguinal adenopathy.   Skin:     General: Skin is warm and dry.      Capillary Refill: Capillary refill takes less than 2 seconds.   Neurological:      General: No focal deficit present.      Mental Status: He is alert and oriented to person, place, and time. Mental status is at baseline.   Psychiatric:         Mood and Affect: Mood normal.         Behavior: Behavior normal.         Thought Content: Thought content normal.         Judgment: Judgment normal.         Assessment/Plan:     Elevated PSA:  Patient has had a negative prostate biopsy as well as negative prostate MRI.  He denies any family history of prostate cancer.  His PSA has decreased slightly increased.  He will return to clinic in 6 months with a PSA.  Discussed the possibility of repeating prostate biopsy if his PSA continues to increase    I, Dr. Sandro Vasquez have seen and personally evaluated the patient. I have formulated the plan reviewed all pertinent imaging and clinical data.  I agree with the nurse practitioner's assessment, and I have personally formulated the plan for this patient's care as described by the midlevel.  Problem List Items Addressed This Visit    None

## 2024-12-05 ENCOUNTER — CLINICAL SUPPORT (OUTPATIENT)
Dept: UROLOGY | Facility: CLINIC | Age: 74
End: 2024-12-05
Payer: MEDICARE

## 2024-12-05 DIAGNOSIS — R97.20 ELEVATED PSA: Primary | ICD-10-CM

## 2024-12-05 LAB — PSA, DIAGNOSTIC: 9.72 NG/ML (ref 0.1–4)

## 2024-12-05 NOTE — PROGRESS NOTES
Patient present for PSA lab draw. SN used aseptic technique when collecting PSA lab to right AC using 22g needle x 1 stick with no bleeding and no complications noted.

## 2024-12-12 ENCOUNTER — OFFICE VISIT (OUTPATIENT)
Dept: UROLOGY | Facility: CLINIC | Age: 74
End: 2024-12-12
Payer: MEDICARE

## 2024-12-12 VITALS
HEIGHT: 75 IN | SYSTOLIC BLOOD PRESSURE: 168 MMHG | DIASTOLIC BLOOD PRESSURE: 82 MMHG | WEIGHT: 289 LBS | OXYGEN SATURATION: 98 % | BODY MASS INDEX: 35.93 KG/M2 | HEART RATE: 80 BPM | RESPIRATION RATE: 18 BRPM

## 2024-12-12 DIAGNOSIS — R97.20 ELEVATED PSA: Primary | ICD-10-CM

## 2024-12-12 PROCEDURE — 99214 OFFICE O/P EST MOD 30 MIN: CPT | Mod: S$PBB,,, | Performed by: UROLOGY

## 2024-12-12 NOTE — PROGRESS NOTES
Subjective:       Patient ID: Dewey Wade is a 74 y.o. male.    Chief Complaint: No chief complaint on file.      HPI: 74-year-old male patient presenting to the clinic to follow up for elevated PSA.  Patient's most recent PSA on 12/05/2024 was 9.72.  He had a benign biopsy with 1 core ASAP on 01/03/2023.  He also had a prostate MRI on 05/19/2023 PI-RADS 2..  He denies any family history of prostate cancer.    12/05/2024   9.72  05/30/2024   8.71  11/27/2023   8.26  5/19/2023    Prostate MRI - PI-RADS 2  05/01/2023  8.38  01/03/2023  Benign Prostate biopsy/ 1 core ASAP  11/01/2022  6.380         Past Medical History:   Past Medical History:   Diagnosis Date    Diabetes mellitus     Elevated PSA     Hypertension     Kidney stone        Past Surgical Historical:   Past Surgical History:   Procedure Laterality Date    COLONOSCOPY      HERNIA REPAIR      KNEE SURGERY Bilateral     LEG SURGERY      flor placed upper leg    TOTAL KNEE REPLACEMENT USING COMPUTER NAVIGATION Right         Medications:   Medication List with Changes/Refills   Current Medications    ALLOPURINOL (ZYLOPRIM) 300 MG TABLET    Take 300 mg by mouth once daily.    AMLODIPINE (NORVASC) 10 MG TABLET    Take 10 mg by mouth once daily.    ATORVASTATIN (LIPITOR) 20 MG TABLET    Take 20 mg by mouth once daily.    BASAGLAR KWIKPEN U-100 INSULIN GLARGINE 100 UNITS/ML SUBQ PEN    INJECT 40 UNITS BY SUBCUTANEOUS ROUTE AT BEDTIME    CIPROFLOXACIN HCL (CIPRO) 500 MG TABLET    Take 1 tablet (500 mg total) by mouth 2 (two) times daily.    DIAZEPAM (VALIUM) 10 MG TAB    Take 1 tablet (10 mg total) by mouth once. for 1 dose    HYDRALAZINE (APRESOLINE) 25 MG TABLET    Take 25 mg by mouth 3 (three) times daily.    HYDROCHLOROTHIAZIDE (HYDRODIURIL) 25 MG TABLET    Take 25 mg by mouth.    METFORMIN (GLUCOPHAGE) 1000 MG TABLET    Take 1,000 mg by mouth 2 (two) times daily.    METOPROLOL SUCCINATE (TOPROL-XL) 200 MG 24 HR TABLET            Past Social History:   Social  History     Socioeconomic History    Marital status: Unknown   Tobacco Use    Smoking status: Never    Smokeless tobacco: Never   Substance and Sexual Activity    Alcohol use: Never    Drug use: Never       Allergies: Review of patient's allergies indicates:  No Known Allergies     Family History:   Family History   Problem Relation Name Age of Onset    Throat cancer Father      Diabetes Mother          Review of Systems:  Review of Systems   Constitutional: Negative.    HENT: Negative.     Eyes: Negative.    Respiratory: Negative.     Cardiovascular: Negative.    Gastrointestinal: Negative.    Endocrine: Negative.    Genitourinary: Negative.    Musculoskeletal: Negative.    Skin: Negative.    Allergic/Immunologic: Negative.    Neurological: Negative.    Hematological: Negative.    Psychiatric/Behavioral: Negative.         Physical Exam:  Physical Exam  Constitutional:       Appearance: He is normal weight.   HENT:      Head: Normocephalic.      Nose: Nose normal.      Mouth/Throat:      Mouth: Mucous membranes are moist.      Pharynx: Oropharynx is clear.   Eyes:      Extraocular Movements: Extraocular movements intact.      Conjunctiva/sclera: Conjunctivae normal.      Pupils: Pupils are equal, round, and reactive to light.   Cardiovascular:      Rate and Rhythm: Normal rate and regular rhythm.      Pulses: Normal pulses.      Heart sounds: Normal heart sounds.   Pulmonary:      Effort: Pulmonary effort is normal.      Breath sounds: Normal breath sounds.   Abdominal:      General: Abdomen is flat. Bowel sounds are normal.      Palpations: Abdomen is soft.      Hernia: There is no hernia in the right inguinal area or left inguinal area.   Genitourinary:     Penis: Normal. No phimosis, paraphimosis, hypospadias, erythema, tenderness or discharge.       Testes: Normal.         Right: Mass, tenderness or swelling not present. Right testis is descended. Cremasteric reflex is present.          Left: Mass, tenderness or  swelling not present. Left testis is descended. Cremasteric reflex is present.       Prostate: Normal.      Rectum: Normal.   Musculoskeletal:         General: Normal range of motion.      Cervical back: Normal range of motion and neck supple.   Lymphadenopathy:      Lower Body: No right inguinal adenopathy. No left inguinal adenopathy.   Skin:     General: Skin is warm and dry.      Capillary Refill: Capillary refill takes less than 2 seconds.   Neurological:      General: No focal deficit present.      Mental Status: He is alert and oriented to person, place, and time. Mental status is at baseline.   Psychiatric:         Mood and Affect: Mood normal.         Behavior: Behavior normal.         Thought Content: Thought content normal.         Judgment: Judgment normal.         Assessment/Plan:     Elevated PSA:  Patient has had a negative prostate biopsy as well as negative prostate MRI in the past however his PSA continues to increase.  He denies any family history of prostate cancer.  We will proceed with repeat prostate biopsy at this time    I, Dr. Sandro Vasquez have seen and personally evaluated the patient. I have formulated the plan reviewed all pertinent imaging and clinical data.  I agree with the nurse practitioner's assessment, and I have personally formulated the plan for this patient's care as described by the midlevel.  Problem List Items Addressed This Visit    None

## 2024-12-20 ENCOUNTER — CLINICAL SUPPORT (OUTPATIENT)
Dept: UROLOGY | Facility: CLINIC | Age: 74
End: 2024-12-20
Payer: MEDICARE

## 2024-12-20 DIAGNOSIS — R97.20 ELEVATED PSA: Primary | ICD-10-CM

## 2024-12-20 RX ORDER — CIPROFLOXACIN 500 MG/1
500 TABLET ORAL 2 TIMES DAILY
Qty: 8 TABLET | Refills: 0 | Status: SHIPPED | OUTPATIENT
Start: 2024-12-20 | End: 2024-12-24

## 2024-12-20 NOTE — PROGRESS NOTES
Care team updated.    Medications, medical and surgical history updated as well as family history.    [place] [facility] [place]. Pt given highlighted written instructions after I verbally went over them with him. Provided clinic phone number to call with any questions or concerns before or after procedure.

## 2025-01-02 ENCOUNTER — PROCEDURE VISIT (OUTPATIENT)
Dept: UROLOGY | Facility: CLINIC | Age: 75
End: 2025-01-02
Payer: MEDICARE

## 2025-01-02 VITALS
BODY MASS INDEX: 36.78 KG/M2 | WEIGHT: 294.31 LBS | RESPIRATION RATE: 14 BRPM | SYSTOLIC BLOOD PRESSURE: 148 MMHG | HEART RATE: 68 BPM | OXYGEN SATURATION: 96 % | DIASTOLIC BLOOD PRESSURE: 67 MMHG

## 2025-01-02 DIAGNOSIS — R97.20 ELEVATED PSA: ICD-10-CM

## 2025-01-02 NOTE — PATIENT INSTRUCTIONS
Patient Education       Prostate Biopsy Discharge Instructions   About this topic   The prostate is a part of your body that helps make semen. The prostate is located at the base of the penis and in front of the rectum.  Prostate biopsy is done:  To help your doctor know if the lump or tumor in your prostate is cancer or not.  If your blood test, called PSA or prostate specific antigen, is high. High PSA in the blood means disease in the prostate.  During a prostate biopsy, the doctor uses a needle to collect pieces of tissue from the prostate. The doctor sends the tissue to the lab. The lab then checks the tissue for infection or cancer.     What care is needed at home?   Ask your doctor what you need to do when you go home. Make sure you ask questions if you do not understand what the doctor says. This way you will know what you need to do.  Rest after the procedure to prevent bleeding from the biopsy site. Avoid activities like heavy lifting and hard exercise.  You may see some blood in your urine or stools for the next few days. You may also have blood in your semen for a few weeks.  Drink up to 8 glasses of water a day to help flush out blood.  Use an ice pack to help with the pain and to help stop the bleeding for the first 2 days after the procedure. Place an ice pack or a bag of frozen peas wrapped in a towel over the painful part. Never put ice right on the skin. Do not leave the ice on more than 10 to 15 minutes at a time. Use ice each hour as needed.  Keep your rectal opening and penis clean to prevent infection.  Keep your wound dry for the next 24 hours. Ask your doctor about when you may take a bath or shower.  Change the dressing if it gets soaked.  What follow-up care is needed?   Your doctor may ask you to make visits to the office to check on your progress. Be sure to keep your visits.  It may take up to 2 weeks for your doctor to get the results. You may be asked to return to the doctor's office  for the result of the biopsy in 2 to 3 weeks. The results will help your doctor understand what kind of problem you have with your prostate. Together you can make a plan for more care.  Your doctor will talk with you if any other treatment is needed.  What drugs may be needed?   The doctor may order drugs to:  Help with pain  Prevent infection  Will physical activity be limited?   Avoid doing activities that may put pressure on your rectal area for the next 7 days. You may be more comfortable if you do not ride a bike, horse, or motorcycle.  Limit your sexual activity for a few days after the procedure. Ask your doctor when you can have sex.  Do not strain when going to the bathroom. Don't hold your urine. Holding back from urinating can irritate your bladder and lead to a urinary tract infection.  Avoid constipation by eating foods high in fiber and staying hydrated. Straining to pass stool can worsen your symptoms as you heal.  What problems could happen?   Infection at the biopsy site  Infection elsewhere in your body  Bleeding from your rectum, or blood in your urine or semen  Tumor spread  Very bad pain  Bladder or rectum perforation  Urinary tract infection  Trouble passing urine  Erectile dysfunction  Reduced sexual activity  When do I need to call the doctor?   Signs of infection like fever of 100.4°F (38°C) or higher, chills, burning or pain when you pass urine.  No urine or more problems passing urine  Dizziness, confusion, or weakness  Yellowish, greenish, or bloody discharge from the penis  Lots of rectal bleeding or large amounts of blood in the urine  Pain does not go away even after taking your drugs  Teach Back: Helping You Understand   The Teach Back Method helps you understand the information we are giving you. After you talk with the staff, tell them in your own words what you learned. This helps to make sure the staff has described each thing clearly. It also helps to explain things that may have  been confusing. Before going home, make sure you can do these:  I can tell you about my procedure.  I can tell you what may help ease my pain.  I can tell you what I will do if I have a fever, chills, problems passing urine, or drainage from my penis.  NO LIFTING GREATER THAN 10 LBS  NO STRENUOUS OR SEXUAL ACTIVITY FOR 3 DAYS    Where can I learn more?   American Cancer Society  https://www.cancer.org/cancer/prostate-cancer/syjebhfps-jzftfemul-txmzkej/how-diagnosed.html   [employer] Society of [place]  https://www.radiologyinfo.org/en/info.cfm?pg=prostate-biopsy   Last Reviewed Date   2021-05-18  Consumer Information Use and Disclaimer   This information is not specific medical advice and does not replace information you receive from your health care provider. This is only a brief summary of general information. It does NOT include all information about conditions, illnesses, injuries, tests, procedures, treatments, therapies, discharge instructions or life-style choices that may apply to you. You must talk with your health care provider for complete information about your health and treatment options. This information should not be used to decide whether or not to accept your health care providers advice, instructions or recommendations. Only your health care provider has the knowledge and training to provide advice that is right for you.  Copyright   Copyright © 2021 [employer] and its affiliates and/or licensors. All rights reserved.     Danger to self

## 2025-01-02 NOTE — PROCEDURES
"Transrectal Ultrasound w/ Biopsy    Date/Time: 1/2/2025 1:00 PM    Performed by: Sandro Vasquez MD  Authorized by: Sandro Vasquez MD    Consent Done?:  Yes (Written)  Time out: Immediately prior to procedure a "time out" was called to verify the correct patient, procedure, equipment, support staff and site/side marked as required.    Indications: Elevated PSA    Position:  Left lateral  Anesthesia:  Pudendal nerve block  Patient sedated: No    Left Base Biopsies: 2  Left Mid Biopsies: 2  Left [place] Biopsies: 2  Right Base Biopsies: 2  Right Mid Biopsies: 2  Right [place] Biopsies: 2  Total Biopsies:  12    Patient tolerance:  Patient tolerated the procedure well with no immediate complications     Patient was brought to the procedure room placed on the table in left lateral decubitus position lidocaine jelly was instilled into the rectum the ultrasound probe was advanced to level of prostate and a total of 10 cc of lidocaine was injected into the bilateral ana prostatic fossa.  A standard 12 core prostate biopsy was obtained patient tolerated the procedure well there were no complications    "

## 2025-01-09 ENCOUNTER — OFFICE VISIT (OUTPATIENT)
Dept: UROLOGY | Facility: CLINIC | Age: 75
End: 2025-01-09
Payer: MEDICARE

## 2025-01-09 VITALS
HEART RATE: 71 BPM | BODY MASS INDEX: 36.56 KG/M2 | HEIGHT: 75 IN | SYSTOLIC BLOOD PRESSURE: 193 MMHG | WEIGHT: 294 LBS | DIASTOLIC BLOOD PRESSURE: 88 MMHG

## 2025-01-09 DIAGNOSIS — R97.20 ELEVATED PSA: Primary | ICD-10-CM

## 2025-01-09 PROCEDURE — 99214 OFFICE O/P EST MOD 30 MIN: CPT | Mod: S$PBB,,, | Performed by: UROLOGY

## 2025-01-09 RX ORDER — PEN NEEDLE, DIABETIC 31 GX5/16"
NEEDLE, DISPOSABLE MISCELLANEOUS
COMMUNITY
Start: 2024-11-11

## 2025-01-09 NOTE — PROGRESS NOTES
"Subjective:       Patient ID: Dewey Wade is a 74 y.o. male.    Chief Complaint: No chief complaint on file.      HPI: 74-year-old male patient presenting to the clinic to follow up for elevated PSA anterior of the most recent pathology from his 2nd prostate biopsy which was benign on 01/02/2025.  Patient's most recent PSA on 12/05/2024 was 9.72.  He had a benign biopsy with 1 core ASAP on 01/03/2023.  He also had a prostate MRI on 05/19/2023 PI-RADS 2..  He denies any family history of prostate cancer.    01/02/2025   Benign prostate biopsy  12/05/2024   9.72  05/30/2024   8.71  11/27/2023   8.26  5/19/2023    Prostate MRI - PI-RADS 2  05/01/2023   8.38  01/03/2023   Benign Prostate biopsy/ 1 core ASAP  11/01/2022    6.380         Past Medical History:   Past Medical History:   Diagnosis Date    Diabetes mellitus     Elevated PSA     Hypercholesteremia     Hypertension     Kidney stone     Stroke        Past Surgical Historical:   Past Surgical History:   Procedure Laterality Date    COLONOSCOPY      HERNIA REPAIR      KNEE SURGERY Bilateral     LEG SURGERY      flor placed upper leg    TOTAL KNEE REPLACEMENT USING COMPUTER NAVIGATION Right     WISDOM TOOTH EXTRACTION          Medications:   Medication List with Changes/Refills   Current Medications    ALLOPURINOL (ZYLOPRIM) 300 MG TABLET    Take 300 mg by mouth once daily.    AMLODIPINE (NORVASC) 10 MG TABLET    Take 10 mg by mouth once daily.    ATORVASTATIN (LIPITOR) 20 MG TABLET    Take 20 mg by mouth once daily.    BASAGLAR KWIKPEN U-100 INSULIN GLARGINE 100 UNITS/ML SUBQ PEN    INJECT 40 UNITS BY SUBCUTANEOUS ROUTE AT BEDTIME    BD ULTRA-FINE MINI PEN NEEDLE 31 GAUGE X 3/16" NDLE    INJECT INSULIN 40 UNITS UNDER THE SKIN DAILY    DIAZEPAM (VALIUM) 10 MG TAB    Take 1 tablet (10 mg total) by mouth once. for 1 dose    HYDRALAZINE (APRESOLINE) 25 MG TABLET    Take 25 mg by mouth 3 (three) times daily.    HYDROCHLOROTHIAZIDE (HYDRODIURIL) 25 MG TABLET    Take 25 " mg by mouth.    METFORMIN (GLUCOPHAGE) 1000 MG TABLET    Take 1,000 mg by mouth 2 (two) times daily.    METOPROLOL SUCCINATE (TOPROL-XL) 200 MG 24 HR TABLET            Past Social History:   Social History     Socioeconomic History    Marital status: Unknown   Tobacco Use    Smoking status: Never    Smokeless tobacco: Never   Substance and Sexual Activity    Alcohol use: Never    Drug use: Never       Allergies: Review of patient's allergies indicates:  No Known Allergies     Family History:   Family History   Problem Relation Name Age of Onset    Throat cancer Father      Diabetes Mother      Hypertension Brother      Diabetes Brother      Hypertension Brother      Hypertension Sister          Review of Systems:  Review of Systems   Constitutional: Negative.    HENT: Negative.     Eyes: Negative.    Respiratory: Negative.     Cardiovascular: Negative.    Gastrointestinal: Negative.    Endocrine: Negative.    Genitourinary: Negative.    Musculoskeletal: Negative.    Skin: Negative.    Allergic/Immunologic: Negative.    Neurological: Negative.    Hematological: Negative.    Psychiatric/Behavioral: Negative.         Physical Exam:  Physical Exam  Constitutional:       Appearance: He is normal weight.   HENT:      Head: Normocephalic.      Nose: Nose normal.      Mouth/Throat:      Mouth: Mucous membranes are moist.      Pharynx: Oropharynx is clear.   Eyes:      Extraocular Movements: Extraocular movements intact.      Conjunctiva/sclera: Conjunctivae normal.      Pupils: Pupils are equal, round, and reactive to light.   Cardiovascular:      Rate and Rhythm: Normal rate and regular rhythm.      Pulses: Normal pulses.      Heart sounds: Normal heart sounds.   Pulmonary:      Effort: Pulmonary effort is normal.      Breath sounds: Normal breath sounds.   Abdominal:      General: Abdomen is flat. Bowel sounds are normal.      Palpations: Abdomen is soft.      Hernia: There is no hernia in the right inguinal area or left  inguinal area.   Genitourinary:     Penis: Normal. No phimosis, paraphimosis, hypospadias, erythema, tenderness or discharge.       Testes: Normal.         Right: Mass, tenderness or swelling not present. Right testis is descended. Cremasteric reflex is present.          Left: Mass, tenderness or swelling not present. Left testis is descended. Cremasteric reflex is present.       Prostate: Normal.      Rectum: Normal.   Musculoskeletal:         General: Normal range of motion.      Cervical back: Normal range of motion and neck supple.   Lymphadenopathy:      Lower Body: No right inguinal adenopathy. No left inguinal adenopathy.   Skin:     General: Skin is warm and dry.      Capillary Refill: Capillary refill takes less than 2 seconds.   Neurological:      General: No focal deficit present.      Mental Status: He is alert and oriented to person, place, and time. Mental status is at baseline.   Psychiatric:         Mood and Affect: Mood normal.         Behavior: Behavior normal.         Thought Content: Thought content normal.         Judgment: Judgment normal.         Assessment/Plan:     Elevated PSA:  Patient has had 2 negative prostate biopsies as well as negative prostate MRI in the past however his PSA continues to increase.  He denies any family history of prostate cancer.  We will have the patient follow-up follow up in 6 months with a PSA    I, Dr. Sandro Vasquez have seen and personally evaluated the patient. I have formulated the plan reviewed all pertinent imaging and clinical data.  I agree with the nurse practitioner's assessment, and I have personally formulated the plan for this patient's care as described by the midlevel.  Problem List Items Addressed This Visit    None

## 2025-06-20 DIAGNOSIS — R97.20 ELEVATED PSA: Primary | ICD-10-CM

## 2025-07-08 ENCOUNTER — CLINICAL SUPPORT (OUTPATIENT)
Dept: FAMILY MEDICINE | Facility: CLINIC | Age: 75
End: 2025-07-08
Payer: COMMERCIAL

## 2025-07-08 DIAGNOSIS — R97.20 ELEVATED PSA: ICD-10-CM

## 2025-07-08 LAB — PSA, DIAGNOSTIC: 9.62 NG/ML (ref 0–4)

## 2025-07-10 ENCOUNTER — OFFICE VISIT (OUTPATIENT)
Dept: UROLOGY | Facility: CLINIC | Age: 75
End: 2025-07-10
Payer: COMMERCIAL

## 2025-07-10 VITALS
DIASTOLIC BLOOD PRESSURE: 70 MMHG | BODY MASS INDEX: 35.43 KG/M2 | SYSTOLIC BLOOD PRESSURE: 145 MMHG | HEART RATE: 77 BPM | HEIGHT: 75 IN | WEIGHT: 285 LBS

## 2025-07-10 DIAGNOSIS — R97.20 ELEVATED PSA: Primary | ICD-10-CM

## 2025-07-10 PROCEDURE — 1159F MED LIST DOCD IN RCRD: CPT | Mod: CPTII,,, | Performed by: UROLOGY

## 2025-07-10 PROCEDURE — 1126F AMNT PAIN NOTED NONE PRSNT: CPT | Mod: CPTII,,, | Performed by: UROLOGY

## 2025-07-10 PROCEDURE — 3288F FALL RISK ASSESSMENT DOCD: CPT | Mod: CPTII,,, | Performed by: UROLOGY

## 2025-07-10 PROCEDURE — 3077F SYST BP >= 140 MM HG: CPT | Mod: CPTII,,, | Performed by: UROLOGY

## 2025-07-10 PROCEDURE — 3008F BODY MASS INDEX DOCD: CPT | Mod: CPTII,,, | Performed by: UROLOGY

## 2025-07-10 PROCEDURE — 1160F RVW MEDS BY RX/DR IN RCRD: CPT | Mod: CPTII,,, | Performed by: UROLOGY

## 2025-07-10 PROCEDURE — 1101F PT FALLS ASSESS-DOCD LE1/YR: CPT | Mod: CPTII,,, | Performed by: UROLOGY

## 2025-07-10 PROCEDURE — 99214 OFFICE O/P EST MOD 30 MIN: CPT | Mod: S$PBB,,, | Performed by: UROLOGY

## 2025-07-10 PROCEDURE — 3078F DIAST BP <80 MM HG: CPT | Mod: CPTII,,, | Performed by: UROLOGY

## 2025-07-10 RX ORDER — CLOTRIMAZOLE AND BETAMETHASONE DIPROPIONATE 10; .64 MG/G; MG/G
CREAM TOPICAL 2 TIMES DAILY
Qty: 45 G | Refills: 1 | Status: SHIPPED | OUTPATIENT
Start: 2025-07-10 | End: 2025-07-24

## 2025-07-10 NOTE — PROGRESS NOTES
"Subjective:       Patient ID: Dewey Wade is a 74 y.o. male.    Chief Complaint: Elevated PSA      HPI:  74-year-old male with a history of elevated PSA he has had 2- prostate biopsies in the past as well as a negative MRI his PSA in December of 2024 was 9.7 it has gone down to 9.6    Past Medical History:   Past Medical History:   Diagnosis Date    Diabetes mellitus     Elevated PSA     Hypercholesteremia     Hypertension     Kidney stone     Stroke        Past Surgical Historical:   Past Surgical History:   Procedure Laterality Date    COLONOSCOPY      HERNIA REPAIR      KNEE SURGERY Bilateral     LEG SURGERY      flor placed upper leg    TOTAL KNEE REPLACEMENT USING COMPUTER NAVIGATION Right     WISDOM TOOTH EXTRACTION          Medications:   Medication List with Changes/Refills   New Medications    CLOTRIMAZOLE-BETAMETHASONE 1-0.05% (LOTRISONE) CREAM    Apply topically 2 (two) times daily. for 14 days   Current Medications    ALLOPURINOL (ZYLOPRIM) 300 MG TABLET    Take 300 mg by mouth once daily.    AMLODIPINE (NORVASC) 10 MG TABLET    Take 10 mg by mouth once daily.    ATORVASTATIN (LIPITOR) 20 MG TABLET    Take 20 mg by mouth once daily.    BASAGLAR KWIKPEN U-100 INSULIN GLARGINE 100 UNITS/ML SUBQ PEN    INJECT 40 UNITS BY SUBCUTANEOUS ROUTE AT BEDTIME    BD ULTRA-FINE MINI PEN NEEDLE 31 GAUGE X 3/16" NDLE    INJECT INSULIN 40 UNITS UNDER THE SKIN DAILY    DIAZEPAM (VALIUM) 10 MG TAB    Take 1 tablet (10 mg total) by mouth once. for 1 dose    HYDRALAZINE (APRESOLINE) 25 MG TABLET    Take 25 mg by mouth 3 (three) times daily.    HYDROCHLOROTHIAZIDE (HYDRODIURIL) 25 MG TABLET    Take 25 mg by mouth.    METFORMIN (GLUCOPHAGE) 1000 MG TABLET    Take 1,000 mg by mouth 2 (two) times daily.    METOPROLOL SUCCINATE (TOPROL-XL) 200 MG 24 HR TABLET            Past Social History: Social History[1]    Allergies: Review of patient's allergies indicates:  No Known Allergies     Family History:   Family History   Problem " Relation Name Age of Onset    Throat cancer Father      Diabetes Mother      Hypertension Brother      Diabetes Brother      Hypertension Brother      Hypertension Sister          Review of Systems:  Review of Systems   Constitutional:  Negative for activity change and appetite change.   HENT:  Negative for congestion and dental problem.    Eyes:  Negative for visual disturbance.   Respiratory:  Negative for chest tightness and shortness of breath.    Cardiovascular:  Negative for chest pain.   Gastrointestinal:  Negative for abdominal distention and abdominal pain.   Genitourinary:  Negative for decreased urine volume, difficulty urinating, dysuria, enuresis, flank pain, frequency, genital sores, hematuria, penile discharge, penile pain, penile swelling, scrotal swelling, testicular pain and urgency.   Musculoskeletal:  Negative for back pain and neck pain.   Skin:  Negative for color change.   Neurological:  Negative for dizziness.   Hematological:  Negative for adenopathy.   Psychiatric/Behavioral:  Negative for agitation, behavioral problems and confusion.        Physical Exam:  Physical Exam  Constitutional:       General: He is not in acute distress.     Appearance: He is well-developed.   HENT:      Head: Normocephalic and atraumatic.      Nose: Nose normal.   Eyes:      General: No scleral icterus.     Conjunctiva/sclera: Conjunctivae normal.      Pupils: Pupils are equal, round, and reactive to light.   Neck:      Thyroid: No thyromegaly.      Trachea: No tracheal deviation.   Cardiovascular:      Rate and Rhythm: Normal rate and regular rhythm.      Heart sounds: Normal heart sounds.   Pulmonary:      Effort: Pulmonary effort is normal. No respiratory distress.      Breath sounds: Normal breath sounds. No wheezing or rales.   Abdominal:      General: Bowel sounds are normal. There is no distension.      Palpations: Abdomen is soft.      Tenderness: There is no abdominal tenderness. There is no guarding or  rebound.   Genitourinary:     Penis: Normal. No tenderness.       Prostate: Normal.   Musculoskeletal:         General: No deformity. Normal range of motion.      Cervical back: Neck supple.   Lymphadenopathy:      Cervical: No cervical adenopathy.   Skin:     General: Skin is warm and dry.      Findings: No erythema or rash.   Neurological:      Mental Status: He is alert and oriented to person, place, and time.      Cranial Nerves: No cranial nerve deficit.   Psychiatric:         Behavior: Behavior normal.         Assessment/Plan:       Problem List Items Addressed This Visit    None  Visit Diagnoses         Elevated PSA    -  Primary               74-year-old male with a history of elevated PSA PSA is decreased at 2- biopsies and a negative MRI we will repeat PSA in 6 months patient also complains of a rash in his groin I have looked at looks like jock itch I will prescribe clotrimazole betamethasone cream         [1]   Social History  Socioeconomic History    Marital status: Unknown   Tobacco Use    Smoking status: Never    Smokeless tobacco: Never   Substance and Sexual Activity    Alcohol use: Never    Drug use: Never

## 2025-09-03 RX ORDER — CLOTRIMAZOLE AND BETAMETHASONE DIPROPIONATE 10; .64 MG/G; MG/G
CREAM TOPICAL
Qty: 45 G | Refills: 1 | Status: SHIPPED | OUTPATIENT
Start: 2025-09-03